# Patient Record
Sex: MALE | Race: BLACK OR AFRICAN AMERICAN | NOT HISPANIC OR LATINO
[De-identification: names, ages, dates, MRNs, and addresses within clinical notes are randomized per-mention and may not be internally consistent; named-entity substitution may affect disease eponyms.]

---

## 2024-04-10 ENCOUNTER — APPOINTMENT (OUTPATIENT)
Dept: ORTHOPEDIC SURGERY | Facility: CLINIC | Age: 79
End: 2024-04-10
Payer: MEDICARE

## 2024-04-10 ENCOUNTER — NON-APPOINTMENT (OUTPATIENT)
Age: 79
End: 2024-04-10

## 2024-04-10 VITALS — HEIGHT: 62 IN | BODY MASS INDEX: 27.97 KG/M2 | WEIGHT: 152 LBS

## 2024-04-10 DIAGNOSIS — M17.12 UNILATERAL PRIMARY OSTEOARTHRITIS, LEFT KNEE: ICD-10-CM

## 2024-04-10 DIAGNOSIS — T84.84XA PAIN DUE TO INTERNAL ORTHOPEDIC PROSTHETIC DEVICES, IMPLANTS AND GRAFTS, INITIAL ENCOUNTER: ICD-10-CM

## 2024-04-10 DIAGNOSIS — Z96.651 PAIN DUE TO INTERNAL ORTHOPEDIC PROSTHETIC DEVICES, IMPLANTS AND GRAFTS, INITIAL ENCOUNTER: ICD-10-CM

## 2024-04-10 PROCEDURE — 73564 X-RAY EXAM KNEE 4 OR MORE: CPT | Mod: LT

## 2024-04-10 PROCEDURE — 73562 X-RAY EXAM OF KNEE 3: CPT | Mod: RT

## 2024-04-10 PROCEDURE — 99203 OFFICE O/P NEW LOW 30 MIN: CPT

## 2024-04-10 RX ORDER — MELOXICAM 7.5 MG/1
7.5 TABLET ORAL
Qty: 60 | Refills: 0 | Status: ACTIVE | COMMUNITY
Start: 2024-04-10 | End: 1900-01-01

## 2024-04-10 NOTE — PHYSICAL EXAM
[de-identified] : General Exam  Well developed, well nourished  No apparent distress  Oriented to person, place, and time  Mood: Normal  Affect: Normal  Balance and coordination: Normal  Gait: Normal  left knee exam    Skin: Clean, dry, intact Inspection: No obvious malalignment, no masses, no swelling, no effusion.  Tenderness: no MJLT. No LJLT. No tenderness over the medial and lateral patella facets. No ttp medial/lateral epicondyle, patella tendon, tibial tubercle, pes anserinus, or proximal fibula.  ROM: 0 to 130  pain with deep flexion  Stability: Stable to varus, valgus, lachman testing. Negative anterior/posterior drawer.  Additional tests: Negative McMurrays test, Negative patellar grind test.   Strength: 5/5 Q/H/TA/GS/EHL, no atrophy  Neuro: In tact to light touch throughout in dp/sp/tib/christiane/saph nerve districutions,  DTR's normal Pulses: 2+ DP/PT pulses.  right knee exam    Skin: Clean, dry, intact Inspection: No obvious malalignment, no masses, mild swelling, no effusion.  Tenderness: mild MJLT. No LJLT. No tenderness over the medial and lateral patella facets. No ttp medial/lateral epicondyle, patella tendon, tibial tubercle, pes anserinus, or proximal fibula.  ROM: 0 to 120 no pain with deep flexion  Stability: Stable to varus, valgus, lachman testing. Negative anterior/posterior drawer.  Additional tests: Negative McMurrays test, Negative patellar grind test.   Strength: 5/5 Q/H/TA/GS/EHL, no atrophy  Neuro: In tact to light touch throughout in dp/sp/tib/christiane/saph nerve districutions,  DTR's normal Pulses: 2+ DP/PT pulses.  [de-identified] : The following radiographs were ordered and read by me during this patients visit. I reviewed each radiograph in detail with the patient and discussed the findings as highlighted below.    4 views left knee were obtained today severe osteoarthritis complete loss of joint space osteophyte sclerosis  3 views right knee obtained today unchanged lesion total knee arthroplasty slight lucency at the tibial base and anterior femoral flange

## 2024-04-10 NOTE — HISTORY OF PRESENT ILLNESS
[de-identified] : 79yo male presents with daughter complaining of bilateral knee pain right worse than left.  He reports having a right knee replacement by Dr. Hamlin greater than 10 years ago.  He reports pain worse with ambulation.  He states about 3 years ago he started to develop pain in his right knee.  Pain is anterior medial aspect.  He reports intermittent swelling.  He states most of the time he lives in [place].  He states he is going back to [place] in 7 days.  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  He occasionally takes over-the-counter NSAIDs for his symptoms.  Denies numbness tingling, injuries  The patient's past medical history, past surgical history, medications, allergies, and social history were reviewed by me today with the patient and documented accordingly. In addition, the patient's family history, which is noncontributory to this visit, was also reviewed.

## 2024-04-10 NOTE — DISCUSSION/SUMMARY
[de-identified] : Painful right knee replacement.  No evidence of infection and there is slight lucency on radiographs.  Will obtain inflammatory markers with ESR CRP.  Will obtain CAT scan to further evaluate.  Given prescription for Mobic side effects discussed.  If continues symptomatic may follow-up with arthroplasty

## 2024-04-24 ENCOUNTER — APPOINTMENT (OUTPATIENT)
Dept: ORTHOPEDIC SURGERY | Facility: CLINIC | Age: 79
End: 2024-04-24